# Patient Record
Sex: MALE | Race: WHITE | NOT HISPANIC OR LATINO | Employment: FULL TIME | URBAN - METROPOLITAN AREA
[De-identification: names, ages, dates, MRNs, and addresses within clinical notes are randomized per-mention and may not be internally consistent; named-entity substitution may affect disease eponyms.]

---

## 2020-12-08 ENCOUNTER — TELEPHONE (OUTPATIENT)
Dept: FAMILY MEDICINE | Facility: CLINIC | Age: 25
End: 2020-12-08

## 2020-12-17 PROBLEM — M86.672 OSTEOMYELITIS, CHRONIC, ANKLE OR FOOT, LEFT: Status: ACTIVE | Noted: 2020-12-17

## 2020-12-17 NOTE — ASSESSMENT & PLAN NOTE
Failed Doxycycline started on 11/11/20. Recently Rx Levaquin 750mg x 2 weeks (11/23/20).    MRI 11/13/20 = calcaneal osteo w/ abscess.     Original surgery date 11/19/20    Bone biopsy results 11/28/20 = chronic osteomyelitis     Culture showed Gram Neg light growth. Pseudomonas Sensitive to Cipro/Levaquin      Plan =    Case discussed w/ Dr. Blaise Medley.       1. Continue Levaquin 750 mg as he has been on this x 4 weeks already. Will continue for 2 more weeks to complete a total of 6 weeks   2.  RTC in 2 weeks to reassess.   3. Will not need labs given he is followed by Podiatry on Monday.

## 2020-12-18 ENCOUNTER — OFFICE VISIT (OUTPATIENT)
Dept: FAMILY MEDICINE | Facility: CLINIC | Age: 25
End: 2020-12-18
Payer: COMMERCIAL

## 2020-12-18 VITALS
DIASTOLIC BLOOD PRESSURE: 71 MMHG | TEMPERATURE: 98 F | OXYGEN SATURATION: 99 % | RESPIRATION RATE: 18 BRPM | HEIGHT: 68 IN | WEIGHT: 171 LBS | SYSTOLIC BLOOD PRESSURE: 126 MMHG | HEART RATE: 60 BPM | BODY MASS INDEX: 25.91 KG/M2

## 2020-12-18 DIAGNOSIS — M86.672 OSTEOMYELITIS, CHRONIC, ANKLE OR FOOT, LEFT: Primary | ICD-10-CM

## 2020-12-18 PROCEDURE — 1126F AMNT PAIN NOTED NONE PRSNT: CPT | Mod: S$GLB,,, | Performed by: NURSE PRACTITIONER

## 2020-12-18 PROCEDURE — 1126F PR PAIN SEVERITY QUANTIFIED, NO PAIN PRESENT: ICD-10-PCS | Mod: S$GLB,,, | Performed by: NURSE PRACTITIONER

## 2020-12-18 PROCEDURE — 3008F PR BODY MASS INDEX (BMI) DOCUMENTED: ICD-10-PCS | Mod: CPTII,S$GLB,, | Performed by: NURSE PRACTITIONER

## 2020-12-18 PROCEDURE — 99203 PR OFFICE/OUTPT VISIT, NEW, LEVL III, 30-44 MIN: ICD-10-PCS | Mod: S$GLB,,, | Performed by: NURSE PRACTITIONER

## 2020-12-18 PROCEDURE — 99203 OFFICE O/P NEW LOW 30 MIN: CPT | Mod: S$GLB,,, | Performed by: NURSE PRACTITIONER

## 2020-12-18 PROCEDURE — 3008F BODY MASS INDEX DOCD: CPT | Mod: CPTII,S$GLB,, | Performed by: NURSE PRACTITIONER

## 2020-12-18 RX ORDER — LEVOFLOXACIN 750 MG/1
750 TABLET ORAL DAILY
COMMUNITY
Start: 2020-12-07

## 2020-12-18 NOTE — PROGRESS NOTES
Infectious Diseases Clinic Note    Subjective:       Patient ID: Saul Mckay is a 25 y.o. male     Chief Complaint: No chief complaint on file.        Saul Mckay is a 25 y.o. male here today for consultation regarding chronic osteomyelitis of left calcaneus.  This is a new diagnosis to me.  Referral from Logan Villalobos .   Primary care provider is Logan Villalobos DPM .    25 yr old male with no prior medical history.  Reportedly stepped on a nail October 24th.  He received the tetanus shot shortly thereafter.  He failed Clindamycin and Doxycycline started on 11/11/20. Recently prescribed Levaquin 750mg x 2 weeks (11/23/20).  He remains on the med for a total of 4 weeks. Denies side effects from med. MRI 11/13/20 = calcaneal osteo w/ abscess. His original surgery date 11/19/20.  Culture showed Gram Neg light growth. Pseudomonas Sensitive to Cipro/Levaquin.  Bone biopsy results 11/28/20 = chronic osteomyelitis.  He reports doing rather well since his surgery.  Small wound noted to left calcaneus.  He denies erythema, warmth, drainage, tenderness.  States that he is now able to bear weight on his heel.  He does have some residual pain/discomfort in the left heel.  Otherwise he feels like is improving.  Scheduled to see his podiatrist, Dr. villalobos on Monday.  Gait is improving as well.     No acute issues reported at this time.                    Past Medical History:   Diagnosis Date    Osteomyelitis of left foot        Social History     Socioeconomic History    Marital status: Single     Spouse name: Not on file    Number of children: Not on file    Years of education: Not on file    Highest education level: Not on file   Occupational History    Not on file   Social Needs    Financial resource strain: Not on file    Food insecurity     Worry: Not on file     Inability: Not on file    Transportation needs     Medical: Not on file     Non-medical: Not on file   Tobacco Use    Smoking status: Never Smoker     Smokeless tobacco: Never Used   Substance and Sexual Activity    Alcohol use: Never     Frequency: Never    Drug use: Never    Sexual activity: Not on file   Lifestyle    Physical activity     Days per week: Not on file     Minutes per session: Not on file    Stress: Not on file   Relationships    Social connections     Talks on phone: Not on file     Gets together: Not on file     Attends [employer] service: Not on file     Active member of club or organization: Not on file     Attends meetings of clubs or organizations: Not on file     Relationship status: Not on file   Other Topics Concern    Not on file   Social History Narrative    Not on file         Current Outpatient Medications:     levoFLOXacin (LEVAQUIN) 750 MG tablet, Take 750 mg by mouth once daily., Disp: , Rfl:     Review of Systems   Constitutional: Negative for appetite change, chills and fever.   Respiratory: Negative for cough, chest tightness and shortness of breath.    Cardiovascular: Negative for chest pain, palpitations and leg swelling.   Gastrointestinal: Negative for abdominal pain, diarrhea, nausea and vomiting.   Genitourinary: Negative.    Musculoskeletal: Negative for arthralgias.   Skin: Negative for color change and rash.   Neurological: Negative.    Hematological: Negative.            Objective:      Vitals:    12/18/20 1036   BP: 126/71   Pulse: 60   Resp: 18   Temp: 98.4 °F (36.9 °C)     Physical Exam  Vitals signs and nursing note reviewed.   Constitutional:       General: He is not in acute distress.     Appearance: Normal appearance. He is not ill-appearing.   Neck:      Musculoskeletal: Normal range of motion.   Cardiovascular:      Rate and Rhythm: Normal rate.   Pulmonary:      Effort: Pulmonary effort is normal.   Abdominal:      General: Abdomen is flat.   Musculoskeletal: Normal range of motion.      Left lower leg: No edema.        Feet:    Feet:      Left foot:      Skin integrity: Skin integrity normal. No skin  breakdown, erythema or warmth.   Skin:     General: Skin is warm and dry.      Coloration: Skin is not jaundiced.      Findings: No erythema or rash.   Neurological:      Mental Status: He is alert and oriented to person, place, and time. Mental status is at baseline.   Psychiatric:         Mood and Affect: Mood normal.         Behavior: Behavior normal.         Thought Content: Thought content normal.         Judgment: Judgment normal.             Assessment/Plan:       1. Osteomyelitis, chronic, ankle or foot, left      Problem List Items Addressed This Visit        ID    Osteomyelitis, chronic, ankle or foot, left - Primary    Current Assessment & Plan     Failed Doxycycline started on 11/11/20. Recently Rx Levaquin 750mg x 2 weeks (11/23/20).    MRI 11/13/20 = calcaneal osteo w/ abscess.     Original surgery date 11/19/20    Bone biopsy results 11/28/20 = chronic osteomyelitis     Culture showed Gram Neg light growth. Pseudomonas Sensitive to Cipro/Levaquin      Plan =    Case discussed w/ Dr. Blaise Medley.       1. Continue Levaquin 750 mg as he has been on this x 4 weeks already. Will continue for 2 more weeks to complete a total of 6 weeks   2.  RTC in 2 weeks to reassess.   3. Will not need labs given he is followed by Podiatry on Monday.                 All diagnostic data (labs/imaging) was reviewed with the patient and/or family member in the room.  All questions were answered to their liking. The patient and/or family member voiced understanding of all instructions provided. Expectations regarding follow up and treatment plan were voiced and confirmed prior to departure. The patient was given orders/instructions at the end of the visit for reference.     Follow up:     There are no Patient Instructions on file for this visit.     Follow up in about 2 weeks (around 1/1/2021), or if symptoms worsen or fail to improve.

## 2020-12-18 NOTE — LETTER
December 18, 2020      Logan Ramos DPM  [address]  [place] [address]           [place] [facility]  [address]  [place] [address]  Phone: [phone]  Fax: [phone]          Patient: Saul cMkay   MR Number: [id number]   YOB: 1995   Date of Visit: 12/18/2020       Dear Dr. Logan Ramos:    Thank you for referring Saul Mckay to me for evaluation. Attached you will find relevant portions of my assessment and plan of care.    If you have questions, please do not hesitate to call me. I look forward to following Saul Mckay along with you.    Sincerely,    Silvestre Cam, NP    Enclosure  CC:  No Recipients    If you would like to receive this communication electronically, please contact [email or web address] or [phone] to request more information on [facility] Link access.    For providers and/or their staff who would like to refer a patient to [facility], please contact us through our one-stop-shop provider referral line, [facility] , at [phone].    If you feel you have received this communication in error or would no longer like to receive these types of communications, please e-mail [email or web address]

## 2021-01-22 ENCOUNTER — OFFICE VISIT (OUTPATIENT)
Dept: FAMILY MEDICINE | Facility: CLINIC | Age: 26
End: 2021-01-22
Payer: COMMERCIAL

## 2021-01-22 VITALS
SYSTOLIC BLOOD PRESSURE: 130 MMHG | OXYGEN SATURATION: 99 % | DIASTOLIC BLOOD PRESSURE: 78 MMHG | HEART RATE: 58 BPM | TEMPERATURE: 97 F | RESPIRATION RATE: 16 BRPM

## 2021-01-22 DIAGNOSIS — M86.672 OSTEOMYELITIS, CHRONIC, ANKLE OR FOOT, LEFT: Primary | ICD-10-CM

## 2021-01-22 PROCEDURE — 1126F PR PAIN SEVERITY QUANTIFIED, NO PAIN PRESENT: ICD-10-PCS | Mod: S$GLB,,, | Performed by: NURSE PRACTITIONER

## 2021-01-22 PROCEDURE — 1126F AMNT PAIN NOTED NONE PRSNT: CPT | Mod: S$GLB,,, | Performed by: NURSE PRACTITIONER

## 2021-01-22 PROCEDURE — 99213 PR OFFICE/OUTPT VISIT, EST, LEVL III, 20-29 MIN: ICD-10-PCS | Mod: S$GLB,,, | Performed by: NURSE PRACTITIONER

## 2021-01-22 PROCEDURE — 99213 OFFICE O/P EST LOW 20 MIN: CPT | Mod: S$GLB,,, | Performed by: NURSE PRACTITIONER
